# Patient Record
Sex: MALE | Race: BLACK OR AFRICAN AMERICAN | NOT HISPANIC OR LATINO | Employment: STUDENT | URBAN - METROPOLITAN AREA
[De-identification: names, ages, dates, MRNs, and addresses within clinical notes are randomized per-mention and may not be internally consistent; named-entity substitution may affect disease eponyms.]

---
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
## 2023-11-27 ENCOUNTER — OFFICE VISIT (OUTPATIENT)
Dept: URGENT CARE | Age: 18
End: 2023-11-27
Payer: COMMERCIAL

## 2023-11-27 VITALS
DIASTOLIC BLOOD PRESSURE: 91 MMHG | TEMPERATURE: 97.7 F | RESPIRATION RATE: 18 BRPM | OXYGEN SATURATION: 100 % | HEART RATE: 83 BPM | SYSTOLIC BLOOD PRESSURE: 131 MMHG

## 2023-11-27 DIAGNOSIS — M54.42 ACUTE LEFT-SIDED LOW BACK PAIN WITH LEFT-SIDED SCIATICA: Primary | ICD-10-CM

## 2023-11-27 PROCEDURE — G0382 LEV 3 HOSP TYPE B ED VISIT: HCPCS | Performed by: PHYSICIAN ASSISTANT

## 2023-11-27 RX ORDER — PREDNISONE 10 MG/1
TABLET ORAL
Qty: 21 TABLET | Refills: 0 | Status: SHIPPED | OUTPATIENT
Start: 2023-11-27

## 2023-11-27 NOTE — PROGRESS NOTES
[facility]        NAME: Carrol Pitts is a 19 y.o. male  : 2005    MRN: [id number]  DATE: 2023  TIME: 6:43 PM    Assessment and Plan   Acute left-sided low back pain with left-sided sciatica [M54.42]  1. Acute left-sided low back pain with left-sided sciatica  predniSONE 10 mg tablet            Patient Instructions       Follow up with PCP in 3-5 days. Proceed to  ER if symptoms worsen. Chief Complaint     Chief Complaint   Patient presents with    Back Pain    Leg Pain     Reports issues with sciatica since . Has been getting worse since August . Lower back radiating down left leg. Taking 800mg motrin , 1000mg tylenol . 2-3 times daily          History of Present Illness       Patient here for evaluation of pain in his low back with radiation into his left lower extremity. Patient does have some numb tingling sensation into the left posterior lateral thigh into the left lateral calf. He states it does come and go. He states that since July the symptoms have been more persistent. He did have a similar episode in . He had x-rays done and was told he has some degenerative disc disease. The symptoms did resolve after a few months. He denies any loss of bowel or bladder functions. Back Pain  Associated symptoms include leg pain. Leg Pain         Review of Systems   Review of Systems   Constitutional: Negative. Gastrointestinal: Negative. Genitourinary: Negative. Musculoskeletal:  Positive for back pain. Negative for gait problem, neck pain and neck stiffness. Skin: Negative. Neurological: Negative.           Current Medications       Current Outpatient Medications:     predniSONE 10 mg tablet, Six tablets day 1; 5 tablets day 2; 4 tablets day 3; 3 tablets day 4; 2 tablets day 5; and 1 tablet day 6, Disp: 21 tablet, Rfl: 0    methocarbamol (ROBAXIN) 500 mg tablet, Take 1 tablet (500 mg total) by mouth 2 (two) times a day (Patient not taking: Reported on 11/27/2023), Disp: 20 tablet, Rfl: 0    Current Allergies     Allergies as of 11/27/2023    (No Known Allergies)            The following portions of the patient's history were reviewed and updated as appropriate: allergies, current medications, past family history, past medical history, past social history, past surgical history and problem list.     Past Medical History:   Diagnosis Date    Sciatica        No past surgical history on file. No family history on file. Medications have been verified. Objective   /91   Pulse 83   Temp 97.7 °F (36.5 °C) (Temporal)   Resp 18   SpO2 100%   No LMP for male patient. Physical Exam     Physical Exam  Vitals and nursing note reviewed. Constitutional:       General: He is not in acute distress. Appearance: Normal appearance. He is well-developed. He is not ill-appearing, toxic-appearing or diaphoretic. HENT:      Head: Normocephalic and atraumatic. Eyes:      Extraocular Movements: Extraocular movements intact. Conjunctiva/sclera: Conjunctivae normal.      Pupils: Pupils are equal, round, and reactive to light. Abdominal:      General: Abdomen is flat. Bowel sounds are normal. There is no distension. Tenderness: There is no abdominal tenderness. There is no guarding or rebound. Hernia: No hernia is present. Musculoskeletal:         General: No swelling or tenderness. Normal range of motion. Right lower leg: No edema. Left lower leg: No edema. Comments: Full range of motion of the lumbar spine. Patient does have discomfort with left lateral bend and flexion. Tenderness over the lumbar paravertebrals. Strength 5 out of 5 bilaterally lower extremity. Patient does have pain in the left low back with left straight leg raise at approximately 45 to 50 degrees seated and supine. Normal heel toe gait. Skin:     General: Skin is warm and dry. Neurological:      General: No focal deficit present. Mental Status: He is alert and oriented to person, place, and time. Sensory: No sensory deficit. Motor: No weakness. Gait: Gait normal.      Deep Tendon Reflexes: Reflexes normal.   Psychiatric:         Mood and Affect: Mood normal.         Behavior: Behavior normal.         Thought Content:  Thought content normal.         Judgment: Judgment normal.

## 2023-11-27 NOTE — PATIENT INSTRUCTIONS
Rest injured body part.     Ice for 48-72 hours then you may use warm compresses as directed    You may use OTC Lidoderm patches as directed    You may take Tylenol as needed    Follow-up with primary care physician or Orthopedics for further evaluation in 5-7 days if symptoms persist. You may schedule PCP appointment through [facility]    Go to emergency room if symptoms are worsening    You may self refer to physical therapy for up to 4 weeks